# Patient Record
Sex: MALE | Race: OTHER | NOT HISPANIC OR LATINO | URBAN - METROPOLITAN AREA
[De-identification: names, ages, dates, MRNs, and addresses within clinical notes are randomized per-mention and may not be internally consistent; named-entity substitution may affect disease eponyms.]

---

## 2024-11-16 ENCOUNTER — EMERGENCY (EMERGENCY)
Facility: HOSPITAL | Age: 39
LOS: 1 days | Discharge: HOME | End: 2024-11-16
Attending: EMERGENCY MEDICINE | Admitting: EMERGENCY MEDICINE
Payer: SELF-PAY

## 2024-11-16 VITALS
SYSTOLIC BLOOD PRESSURE: 134 MMHG | HEART RATE: 102 BPM | DIASTOLIC BLOOD PRESSURE: 90 MMHG | RESPIRATION RATE: 18 BRPM | OXYGEN SATURATION: 95 % | TEMPERATURE: 98 F

## 2024-11-16 VITALS
SYSTOLIC BLOOD PRESSURE: 158 MMHG | RESPIRATION RATE: 18 BRPM | OXYGEN SATURATION: 96 % | DIASTOLIC BLOOD PRESSURE: 84 MMHG | HEART RATE: 154 BPM | TEMPERATURE: 98 F | WEIGHT: 145.06 LBS

## 2024-11-16 LAB
ETHANOL SERPL-MCNC: <3 MG/DL — SIGNIFICANT CHANGE UP
PCP SPEC-MCNC: SIGNIFICANT CHANGE UP

## 2024-11-16 PROCEDURE — 99285 EMERGENCY DEPT VISIT HI MDM: CPT

## 2024-11-16 PROCEDURE — 99053 MED SERV 10PM-8AM 24 HR FAC: CPT

## 2024-11-16 RX ORDER — SODIUM CHLORIDE 9 MG/ML
1000 INJECTION, SOLUTION INTRAMUSCULAR; INTRAVENOUS; SUBCUTANEOUS ONCE
Refills: 0 | Status: COMPLETED | OUTPATIENT
Start: 2024-11-16 | End: 2024-11-16

## 2024-11-16 RX ORDER — DIAZEPAM 10 MG/1
10 FILM BUCCAL ONCE
Refills: 0 | Status: DISCONTINUED | OUTPATIENT
Start: 2024-11-16 | End: 2024-11-16

## 2024-11-16 RX ADMIN — SODIUM CHLORIDE 2000 MILLILITER(S): 9 INJECTION, SOLUTION INTRAMUSCULAR; INTRAVENOUS; SUBCUTANEOUS at 01:39

## 2024-11-16 RX ADMIN — DIAZEPAM 10 MILLIGRAM(S): 10 FILM BUCCAL at 01:39

## 2024-11-16 NOTE — ED ADULT NURSE NOTE - OBJECTIVE STATEMENT
Pt presents to ed for altered mental status after using marijuana tonight. pt confused, anxious, paranoid. tachycardic.  placed on CM, fall precautions in place, bed alarm on

## 2024-11-16 NOTE — ED PROVIDER NOTE - OBJECTIVE STATEMENT
42-year-old male brought in by EMS with a police escort for paranoid ideation in the setting of drinking some wine and smoking cannabis from a dispensary at around 9 or 10 this evening.  He is accompanied by a friend who says that he has been paranoid since.  At some point they were together and he ran off and then they were reignited.  The patient does not normally use cannabis products and is visiting from [facility] in [facility].  He just arrived yesterday.  He is extremely paranoid and feels like he is dying or about to fade away if he closes his eyes.  He was only able to be redirected slightly and will require sedation.

## 2024-11-16 NOTE — ED PROVIDER NOTE - NSDCPRINTRESULTS_ED_ALL_ED
bilateral ear Patient requests all Lab, Cardiology, and Radiology Results on their Discharge Instructions

## 2024-11-16 NOTE — ED ADULT NURSE NOTE - NSFALLRISKINTERV_ED_ALL_ED
Assistance OOB with selected safe patient handling equipment if applicable/Assistance with ambulation/Communicate fall risk and risk factors to all staff, patient, and family/Monitor gait and stability/Monitor for mental status changes and reorient to person, place, and time, as needed/Provide visual cue: yellow wristband, yellow gown, etc/Reinforce activity limits and safety measures with patient and family/Toileting schedule using arm’s reach rule for commode and bathroom/Use of alarms - bed, stretcher, chair and/or video monitoring/Call bell, personal items and telephone in reach/Instruct patient to call for assistance before getting out of bed/chair/stretcher/Non-slip footwear applied when patient is off stretcher/[place] to call system/Physically safe environment - no spills, clutter or unnecessary equipment/Purposeful Proactive Rounding/Room/bathroom lighting operational, light cord in reach

## 2024-11-16 NOTE — ED PROVIDER NOTE - NSFOLLOWUPINSTRUCTIONS_ED_ALL_ED_FT
You had an extreme reaction to the cannabis that you used today.      Some people react very poorly to cannabis and become paranoid.  You appear to be one of these people.  It is advisable that you stay away from cannabis and THC products completely as you will likely be paranoid each time.    You were given 10 mg of intravenous diazepam.  This is a sedating medication that treats anxiety.  It is also capable of interrupting and "bad drug trip".  The cannabis will still need to make its way out of your body.  By morning you should feel back to normal.  You may be somewhat tired from having received the diazepam.    Please return to the emergency department for any worsening symptoms.  I do not however anticipate that you will have any.

## 2024-11-16 NOTE — ED PROVIDER NOTE - CLINICAL SUMMARY MEDICAL DECISION MAKING FREE TEXT BOX
39-year-old male presenting with extremely paranoid ideation in the setting of smoking cannabis earlier this evening. 39-year-old male presenting with extremely paranoid ideation in the setting of smoking cannabis earlier this evening. He does not usually use cannabis and his friend offered him some.   He only  used it once before and was paranoid as well apparently.  He was unable to be redirected verbally.  An IV was placed and he was given diazepam 10 mg IV with excellent effect.  Within minutes he was more calm and able to interact more normally, albeit intoxicated now from the diazepam.  His friend is at the bedside.  Will check an EKG and hydrate given his extreme tachycardia.  Screening labs were sent. 39-year-old male presenting with extremely paranoid ideation in the setting of smoking cannabis earlier this evening. He does not usually use cannabis and his friend offered him some.   He only  used it once before and was paranoid as well apparently.  He was unable to be redirected verbally.  An IV was placed and he was given diazepam 10 mg IV with excellent effect.  Within minutes he was more calm and able to interact more normally, albeit intoxicated now from the diazepam.  His friend is at the bedside.  Will check an EKG and hydrate given his extreme tachycardia.  Screening labs were sent for intoxication [alcohol level and urine drug screen].  His friend appeared to have purchased the cannabis from a reputable dispensary.  Chances of contamination appear low but dispensary products can have extremely high THC content.

## 2024-11-16 NOTE — ED PROVIDER NOTE - PATIENT PORTAL LINK FT
You can access the FollowMyHealth Patient Portal offered by [facility] by registering at the following website: [email or web address]. By joining [employer]’s FollowMyHealth portal, you will also be able to view your health information using other applications (apps) compatible with our system.

## 2024-11-16 NOTE — ED PROVIDER NOTE - PHYSICAL EXAMINATION
VITAL SIGNS: I have reviewed nursing notes and confirm.   CONST: Well-developed; well-nourished;  very restless and paranoid, constantly reaching for to my hands and desperation  ENT: No nasal discharge; airway clear.  HEAD: Normocephalic; atraumatic.  EYES: Sclera clear. Pupils round and symmetrical bilaterally.  CARD: S1, S2 normal; no murmurs, gallops, or rubs. Regular rate and rhythm.  RESP: No wheezes, rales or rhonchi. Breathing comfortably; speaking in full sentences.   ABD: Soft; non-distended; non-tender; no rebound or guarding.  MSK: No acute deformities noted to extremities.  NEURO: Alert, oriented. Speech is fluent and appropriate. Moving all extremities appropriately. No gross motor or sensory abnormalities.   SKIN: Skin is normal temperature; no diaphoresis; no pallor.   PSYCH:  Very paranoid and restless, preoccupied that he is dying, minimally redirectable.

## 2024-11-16 NOTE — ED PROVIDER NOTE - PROGRESS NOTE DETAILS
The patient had a dramatic improvement after 10 mg of Valium IV.  He is now much more calm and is thinking everyone for saving his life.  He is able to articulate that he had extreme paranoia as a side effect of the cannabis use.  Will observe for a while longer in the ED to be sure that he remains calm and steady.  His friend is at the bedside. gianni - received s/o from dr oliveira, [facility] pos ua, pending sobriety, resting comfortably in stretcher sleeping. gianni -–patient noted to registration he is feeling much better he is accompanied with his friend and he would like to go back to the hotel.  Patient has no acute medical complaints well-appearing alert and oriented x 3 coherent will discharge at this time

## 2024-11-19 DIAGNOSIS — R41.82 ALTERED MENTAL STATUS, UNSPECIFIED: ICD-10-CM

## 2024-11-19 DIAGNOSIS — T40.715A ADVERSE EFFECT OF CANNABIS, INITIAL ENCOUNTER: ICD-10-CM

## 2024-11-19 DIAGNOSIS — R00.0 TACHYCARDIA, UNSPECIFIED: ICD-10-CM

## 2024-11-19 DIAGNOSIS — F12.90 CANNABIS USE, UNSPECIFIED, UNCOMPLICATED: ICD-10-CM
